# Patient Record
Sex: FEMALE | Race: BLACK OR AFRICAN AMERICAN | Employment: OTHER | URBAN - METROPOLITAN AREA
[De-identification: names, ages, dates, MRNs, and addresses within clinical notes are randomized per-mention and may not be internally consistent; named-entity substitution may affect disease eponyms.]

---

## 2017-01-20 ENCOUNTER — OFFICE VISIT (OUTPATIENT)
Dept: CARDIOLOGY CLINIC | Age: 72
End: 2017-01-20

## 2017-01-20 VITALS
BODY MASS INDEX: 29.49 KG/M2 | HEIGHT: 65 IN | DIASTOLIC BLOOD PRESSURE: 82 MMHG | WEIGHT: 177 LBS | HEART RATE: 96 BPM | SYSTOLIC BLOOD PRESSURE: 130 MMHG | OXYGEN SATURATION: 99 %

## 2017-01-20 DIAGNOSIS — I48.19 PERSISTENT ATRIAL FIBRILLATION (HCC): Primary | ICD-10-CM

## 2017-01-20 DIAGNOSIS — I05.0 MITRAL VALVE STENOSIS, RHEUMATIC: ICD-10-CM

## 2017-01-20 DIAGNOSIS — E78.00 PURE HYPERCHOLESTEROLEMIA: ICD-10-CM

## 2017-01-20 DIAGNOSIS — E11.9 TYPE 2 DIABETES MELLITUS WITHOUT COMPLICATION, WITHOUT LONG-TERM CURRENT USE OF INSULIN (HCC): ICD-10-CM

## 2017-01-20 DIAGNOSIS — I10 ESSENTIAL HYPERTENSION, BENIGN: ICD-10-CM

## 2017-01-20 RX ORDER — DILTIAZEM HYDROCHLORIDE 180 MG/1
180 CAPSULE, COATED, EXTENDED RELEASE ORAL DAILY
Qty: 30 CAP | Refills: 11 | Status: SHIPPED | OUTPATIENT
Start: 2017-01-20

## 2017-01-20 NOTE — MR AVS SNAPSHOT
Visit Information Date & Time Provider Department Dept. Phone Encounter #  
 1/20/2017 10:00 AM Wendy Mina MD [facility] [phone] Follow-up Instructions Return in about 6 months (around 7/20/2017). Upcoming Health Maintenance Date Due  
 FOOT EXAM Q1 2/27/1955 FOBT Q 1 YEAR AGE 50-75 2/27/1993 ZOSTER VACCINE AGE 60> 2/27/2003 OSTEOPOROSIS SCREENING (DEXA) 2/27/2008 Pneumococcal 65+ Low/Medium Risk (1 of 2 - PCV13) 2/27/2008 MEDICARE YEARLY EXAM 2/27/2008 DTaP/Tdap/Td series (2 - Td) 1/1/2011 HEMOGLOBIN A1C Q6M 2/17/2011 EYE EXAM RETINAL OR DILATED Q1 3/3/2011 MICROALBUMIN Q1 8/17/2011 LIPID PANEL Q1 8/17/2011 GLAUCOMA SCREENING Q2Y 3/3/2012 INFLUENZA AGE 9 TO ADULT 8/1/2016 BREAST CANCER SCRN MAMMOGRAM 12/5/2016 Allergies as of 1/20/2017  Review Complete On: 1/20/2017 By: Wendy Mina MD  
 No Known Allergies Current Immunizations  Reviewed on 3/24/2010 Name Date TDAP Vaccine 1/1/2001 Not reviewed this visit You Were Diagnosed With   
  
 Codes Comments Persistent atrial fibrillation (HCC)    -  Primary ICD-10-CM: I48.1 ICD-9-CM: 427.31 Mitral valve stenosis, rheumatic     ICD-10-CM: I05.0 ICD-9-CM: 394.0 Pure hypercholesterolemia     ICD-10-CM: E78.00 ICD-9-CM: 272.0 Essential hypertension, benign     ICD-10-CM: I10 
ICD-9-CM: 401.1 Type 2 diabetes mellitus without complication, without long-term current use of insulin (HCC)     ICD-10-CM: E11.9 ICD-9-CM: 250.00 Vitals BP Pulse Height(growth percentile) Weight(growth percentile) LMP SpO2  
 130/82 96 5' 5\" (1.651 m) 177 lb (80.3 kg) 01/01/1976 99% BMI OB Status Smoking Status 29.45 kg/m2 Postmenopausal Never Smoker Vitals History BMI and BSA Data Body Mass Index Body Surface Area  
 29.45 kg/m 2 1.92 m 2 Preferred Pharmacy Pharmacy Name Phone [facility] [address], [address] [place] [address] [phone] Your Updated Medication List  
  
   
This list is accurate as of: 1/20/17 10:34 AM.  Always use your most recent med list.  
  
  
  
  
 apixaban 5 mg tablet Commonly known as:  Lyell Mola Take 1 Tab by mouth two (2) times a day. aspirin 81 mg chewable tablet Take 81 mg by mouth daily. atorvastatin 10 mg tablet Commonly known as:  LIPITOR Take  by mouth daily. benazepril 20 mg tablet Commonly known as:  LOTENSIN Take 20 mg by mouth daily. CALTRATE 600+D PLUS MINERALS 600 mg calcium- 400 unit Tab Generic drug:  Calcium Carbonate-Vit D3-Min Take  by mouth. dilTIAZem  mg ER capsule Commonly known as:  CARDIZEM CD Take 1 Cap by mouth daily. glipiZIDE 5 mg tablet Commonly known as:  Tonie Kwabena Take 1 Tab by mouth daily. metoprolol succinate 50 mg XL tablet Commonly known as:  TOPROL-XL  
TAKE ONE TABLET BY MOUTH TWICE DAILY MULTIVITAMIN PO Take  by mouth. SITagliptin 50 mg tablet Commonly known as:  Rodriguez Hartford Take 50 mg by mouth daily. Prescriptions Sent to Pharmacy Refills  
 dilTIAZem CD (CARDIZEM CD) 180 mg ER capsule 11 Sig: Take 1 Cap by mouth daily. Class: Normal  
 Pharmacy: [address] Ph #: [phone] Route: Oral  
  
We Performed the Following AMB POC EKG ROUTINE W/ 12 LEADS, INTER & REP [28949 CPT(R)] Follow-up Instructions Return in about 6 months (around 7/20/2017). To-Do List   
 06/20/2017 ECHO:  2D ECHO COMPLETE ADULT (TTE) W OR WO CONTR   
  
 06/20/2017 10:00 AM  
  Appointment with [facility]- IE33 MACHINE (WT ) at [facility] NON-INVASIVE CARD ([phone]) Age Limit for ALL Heart procedures @ all [employer] facilities: 18 yrs and older only. Under the age of 25, refer to [address] [place] [address] ([phone]).    Wt Limit: 350lbs. This study requires patient to bring a written physician's order or MD office may fax the order to Central Scheduling at [phone]. Patient needs to bring a current list of all medications. No preparation is required for this study. Patients should report 15 minutes prior to their [facility], [address]. Introducing [facility] & HEALTH SERVICES! Caryl Villa introduces [employer] patient portal. Now you can access parts of your medical record, email your doctor's office, and request medication refills online. 1. In your internet browser, go to [email or web address] 2. Click on the First Time User? Click Here link in the Sign In box. You will see the New Member Sign Up page. 3. Enter your [employer] Access Code exactly as it appears below. You will not need to use this code after youve completed the sign-up process. If you do not sign up before the expiration date, you must request a new code. · [employer] Access Code: [email or web address] Expires: 4/20/2017  9:54 AM 
 
4. Enter the last four digits of your Social Security Number (xxxx) and Date of Birth (mm/dd/yyyy) as indicated and click Submit. You will be taken to the next sign-up page. 5. Create a [employer] ID. This will be your [employer] login ID and cannot be changed, so think of one that is secure and easy to remember. 6. Create a [employer] password. You can change your password at any time. 7. Enter your Password Reset Question and Answer. This can be used at a later time if you forget your password. 8. Enter your e-mail address. You will receive e-mail notification when new information is available in [address] 9. Click Sign Up. You can now view and download portions of your medical record. 10. Click the Download Summary menu link to download a portable copy of your medical information. If you have questions, please visit the Frequently Asked Questions section of the [employer] website. Remember, [employer] is NOT to be used for urgent needs. For medical emergencies, dial 911. Now available from your iPhone and Android! Please provide this summary of care documentation to your next provider. Your primary care clinician is listed as Cayla Christian. If you have any questions after today's visit, please call [phone].

## 2017-01-20 NOTE — PROGRESS NOTES
HISTORY OF PRESENT ILLNESS  Logan Clarke is a 66 y.o. female. Irregular Heart Beat    Pertinent negatives include no fever, no chest pain, no claudication, no orthopnea, no PND, no abdominal pain, no nausea, no vomiting, no headaches, no dizziness, no cough and no shortness of breath. Patient presents for a follow-up office visit. She has a past medical history significant for long-standing hypertension, dyslipidemia, and diabetes mellitus, type II. The patient was initially referred here for newly diagnosed atrial fibrillation which was found on a routine physical exam.  The patient reports a history of rheumatic fever as a young child, and states she has been told she had a cardiac murmur in the past.  She underwent an echocardiogram in June 2014, which showed normal LV systolic function, evidence of mild rheumatic mitral valvular disease with mild mitral stenosis without significant regurgitation. The remainder of her valve function was normal.  She also underwent an exercise nuclear stress test where she walked for 6 minutes on the treadmill without symptoms or EKG changes. Patient then underwent a followup echocardiogram in July 2015, which is essentially unchanged compared to the year prior. Patient was last seen in the office 6 months ago. Patient reports no significant change in her activity level since last visit. No new palpitations, dizziness, or syncope. No chest pain at rest or with exertion. No shortness of breath, orthopnea, PND, or leg swelling. The patient does state that her PCP decreased her metoprolol dosage to just once daily from twice daily. Past Medical History   Diagnosis Date    Arthritis     Cardiac echocardiogram 07/17/2015     A-fib (sinus rhythm on study of 6/12/14). EF 55-60%. No WMA. Mild LVH. Indeterminate diastolic fx. RVSP 35 mmHg. Mild-mod MS (mean grad 6 mmHg).       Cardiac Holter monitor, abnormal 01/12/2015     Atrial fibrillation with avg HR of 83 bpm and appropriate HR variation.  Cardiac nuclear imaging test 06/12/2014     Mildly abnormal.  No focal ischemia or prior infarction in setting of borderline to mild TID of 1.28, so balanced 3-vessel ischemia not completely excluded. No RWMA. EF 70%. Baseline A-fib w/appropriate increase in heart rate. Ex time 6 min 4 sec.  CTS (carpal tunnel syndrome) 1/27/2010    Diabetes ([address] 1/27/2010    Hypercholesterolemia     Hypertension     Hypertonicity of bladder     Mitral valve stenosis, rheumatic      Mild    Overactive bladder 1/27/2010    PAF (paroxysmal atrial fibrillation) [facility]) April 2014    Rheumatic fever age 1   [facility] Toe deformity 1/27/2010    Urinary tract infection, site not specified       Current Outpatient Prescriptions   Medication Sig Dispense Refill    dilTIAZem CD (CARDIZEM CD) 180 mg ER capsule Take 1 Cap by mouth daily. 30 Cap 11    aspirin 81 mg chewable tablet Take 81 mg by mouth daily.  sitaGLIPtin (JANUVIA) 50 mg tablet Take 50 mg by mouth daily.  metoprolol succinate (TOPROL-XL) 50 mg XL tablet TAKE ONE TABLET BY MOUTH TWICE DAILY (Patient taking differently: TAKE ONE TABLET BY MOUTH  DAILY) 60 Tab 6    apixaban (ELIQUIS) 5 mg tablet Take 1 Tab by mouth two (2) times a day. 60 Tab 6    benazepril (LOTENSIN) 20 mg tablet Take 20 mg by mouth daily.  atorvastatin (LIPITOR) 10 mg tablet Take  by mouth daily.  Calcium Carbonate-Vit D3-Min (CALTRATE 600+D PLUS MINERALS) 600 mg calcium- 400 unit tab Take  by mouth.  MULTIVITAMINS (MULTIVITAMIN PO) Take  by mouth.  glipiZIDE (GLUCOTROL) 5 mg tablet Take 1 Tab by mouth daily. 90 Tab 3       No Known Allergies     Social History   Substance Use Topics    Smoking status: Never Smoker    Smokeless tobacco: Never Used    Alcohol use No         Review of Systems   Constitutional: Negative for chills, fever and weight loss. HENT: Negative for nosebleeds.     Eyes: Negative for blurred vision and double vision. Respiratory: Negative for cough, shortness of breath and wheezing. Cardiovascular: Negative for chest pain, palpitations, orthopnea, claudication, leg swelling and PND. Gastrointestinal: Negative for abdominal pain, heartburn, nausea and vomiting. Genitourinary: Negative for dysuria and hematuria. Musculoskeletal: Negative for falls, joint pain and myalgias. Skin: Negative for rash. Neurological: Negative for dizziness, focal weakness and headaches. Endo/Heme/Allergies: Does not bruise/bleed easily. Psychiatric/Behavioral: Negative for substance abuse. Visit Vitals    /82    Pulse 96    Ht 5' 5\" (1.651 m)    Wt 80.3 kg (177 lb)    LMP 01/01/1976    SpO2 99%    BMI 29.45 kg/m2      Physical Exam   Constitutional: She is oriented to person, place, and time. She appears well-developed and well-nourished. HENT:   Head: Normocephalic and atraumatic. Eyes: Conjunctivae are normal.   Neck: Neck supple. No JVD present. Carotid bruit is not present. Cardiovascular: Normal rate, S1 normal, S2 normal and normal pulses. An irregularly irregular rhythm present. Exam reveals no gallop. Murmur heard. Systolic murmur is present with a grade of 1/6    Diastolic murmur is present  at the lower left sternal border  Pulmonary/Chest: Effort normal and breath sounds normal. She has no wheezes. She has no rales. Abdominal: Soft. Bowel sounds are normal. There is no tenderness. Musculoskeletal: She exhibits no edema. Neurological: She is alert and oriented to person, place, and time. Skin: Skin is warm and dry. EKG:  Atrial fibrillation, Controlled heart rate in the 90s, normal axis, nonspecific ST-T abnormality. Compared to previous EKG, no significant interval change. ASSESSMENT and PLAN    Persistent atrial fibrillation. Patient remained asymptomatic to her arrhythmia. This was incidentally diagnosed during a routine physical exam in April 2014.     She remains on Eliquis for anticoagulation, which can be continued as long as she does not develop severe valvular heart disease. Her rates appear adequately controlled on a combination of metoprolol and Cardizem, both of which I would continue. 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 Patient underwent a followup echocardiogram in July 2015 which was not significant changed compared to 2014. She continues to have mild to moderate stenosis of her mitral valve secondary to rheumatic heart disease. I would like to repeat an echocardiogram in 5-6 months to reevaluate her mitral stenosisr. Hypertension. Patient blood pressure is reasonably well controlled on her current antihypertensive regimen. All of which I would continue. Dyslipidemia. Patient currently on atorvastatin 10 mg daily. This is followed closely by her PCP. Diabetes mellitus, type II. This is been followed by her PCP. Her goal A1c should be under 7.     Followup in 6 months, sooner if needed up

## 2017-06-27 ENCOUNTER — HOSPITAL ENCOUNTER (OUTPATIENT)
Dept: NON INVASIVE DIAGNOSTICS | Age: 72
Discharge: HOME | End: 2017-06-27
Attending: INTERNAL MEDICINE
Payer: MEDICARE

## 2017-06-27 DIAGNOSIS — I05.0 MITRAL VALVE STENOSIS, RHEUMATIC: ICD-10-CM

## 2017-06-27 PROCEDURE — 93306 TTE W/DOPPLER COMPLETE: CPT

## 2017-10-12 ENCOUNTER — OFFICE VISIT (OUTPATIENT)
Dept: CARDIOLOGY CLINIC | Age: 72
End: 2017-10-12

## 2017-10-12 VITALS
HEIGHT: 65 IN | SYSTOLIC BLOOD PRESSURE: 118 MMHG | HEART RATE: 115 BPM | WEIGHT: 172 LBS | OXYGEN SATURATION: 98 % | BODY MASS INDEX: 28.66 KG/M2 | DIASTOLIC BLOOD PRESSURE: 70 MMHG

## 2017-10-12 DIAGNOSIS — I48.19 PERSISTENT ATRIAL FIBRILLATION (HCC): Primary | ICD-10-CM

## 2017-10-12 DIAGNOSIS — I05.0 MITRAL VALVE STENOSIS, RHEUMATIC: ICD-10-CM

## 2017-10-12 DIAGNOSIS — E11.9 TYPE 2 DIABETES MELLITUS WITHOUT COMPLICATION, WITHOUT LONG-TERM CURRENT USE OF INSULIN (HCC): ICD-10-CM

## 2017-10-12 DIAGNOSIS — E78.00 PURE HYPERCHOLESTEROLEMIA: ICD-10-CM

## 2017-10-12 NOTE — MR AVS SNAPSHOT
Visit Information Date & Time Provider Department Dept. Phone Encounter #  
 10/12/2017  8:20 AM Corinne Fabry, MD [facility] [id number] Upcoming Health Maintenance Date Due  
 FOOT EXAM Q1 2/27/1955 FOBT Q 1 YEAR AGE 50-75 2/27/1993 ZOSTER VACCINE AGE 60> 12/27/2002 OSTEOPOROSIS SCREENING (DEXA) 2/27/2008 Pneumococcal 65+ Low/Medium Risk (1 of 2 - PCV13) 2/27/2008 MEDICARE YEARLY EXAM 2/27/2008 DTaP/Tdap/Td series (2 - Td) 1/1/2011 HEMOGLOBIN A1C Q6M 2/17/2011 EYE EXAM RETINAL OR DILATED Q1 3/3/2011 MICROALBUMIN Q1 8/17/2011 LIPID PANEL Q1 8/17/2011 GLAUCOMA SCREENING Q2Y 3/3/2012 INFLUENZA AGE 9 TO ADULT 8/1/2017 Allergies as of 10/12/2017  Review Complete On: 10/12/2017 By: Polo Cook No Known Allergies Current Immunizations  Reviewed on 3/24/2010 Name Date TDAP Vaccine 1/1/2001 Not reviewed this visit You Were Diagnosed With   
  
 Codes Comments Persistent atrial fibrillation (HCC)    -  Primary ICD-10-CM: I48.1 ICD-9-CM: 427.31 Vitals BP Pulse Height(growth percentile) Weight(growth percentile) LMP SpO2  
 118/70 (!) 115 5' 5\" (1.651 m) 172 lb (78 kg) 01[birth year]01/1976 98% BMI OB Status Smoking Status 28.62 kg/m2 Postmenopausal Never Smoker Vitals History BMI and BSA Data Body Mass Index Body Surface Area  
 28.62 kg/m 2 1.89 m 2 Preferred Pharmacy Pharmacy Name Phone [facility] [address] [place] [address] [phone] Your Updated Medication List  
  
   
This list is accurate as of: 10/12/17  9:24 AM.  Always use your most recent med list.  
  
  
  
  
 apixaban 5 mg tablet Commonly known as:  Jodelle Patient Take 1 Tab by mouth two (2) times a day. aspirin 81 mg chewable tablet Take 81 mg by mouth daily. atorvastatin 10 mg tablet Commonly known as:  LIPITOR Take  by mouth daily. benazepril 20 mg tablet Commonly known as:  LOTENSIN Take 20 mg by mouth daily. CALTRATE 600+D PLUS MINERALS 600 mg calcium- 400 unit Tab Generic drug:  Calcium Carbonate-Vit D3-Min Take  by mouth. dilTIAZem  mg ER capsule Commonly known as:  CARDIZEM CD Take 1 Cap by mouth daily. glipiZIDE 5 mg tablet Commonly known as:  Katerin Petri Take 1 Tab by mouth daily. metoprolol succinate 50 mg XL tablet Commonly known as:  TOPROL-XL  
TAKE ONE TABLET BY MOUTH TWICE DAILY MULTIVITAMIN PO Take  by mouth. SITagliptin 50 mg tablet Commonly known as:  Shawanda Pipe Take 50 mg by mouth daily. We Performed the Following AMB POC EKG ROUTINE W/ 12 LEADS, INTER & REP [40852 CPT(R)] Introducing [facility]! Vicki Nixon introduces [employer] patient portal. Now you can access parts of your medical record, email your doctor's office, and request medication refills online. 1. In your internet browser, go to [email or web address] 2. Click on the First Time User? Click Here link in the Sign In box. You will see the New Member Sign Up page. 3. Enter your [employer] Access Code exactly as it appears below. You will not need to use this code after youve completed the sign-up process. If you do not sign up before the expiration date, you must request a new code. · [employer] Access Code: [email or web address] Expires: 1/10/2018  9:24 AM 
 
4. Enter the last four digits of your Social Security Number (xxxx) and Date of Birth (mm/dd/yyyy) as indicated and click Submit. You will be taken to the next sign-up page. 5. Create a [employer] ID. This will be your [employer] login ID and cannot be changed, so think of one that is secure and easy to remember. 6. Create a [employer] password. You can change your password at any time. 7. Enter your Password Reset Question and Answer. This can be used at a later time if you forget your password. 8. Enter your e-mail address. You will receive e-mail notification when new information is available in [address] 9. Click Sign Up. You can now view and download portions of your medical record. 10. Click the Download Summary menu link to download a portable copy of your medical information. If you have questions, please visit the Frequently Asked Questions section of the [employer] website. Remember, [employer] is NOT to be used for urgent needs. For medical emergencies, dial 911. Now available from your iPhone and Android! Please provide this summary of care documentation to your next provider. Your primary care clinician is listed as Nel Bashir. If you have any questions after today's visit, please call [phone].

## 2017-10-12 NOTE — PROGRESS NOTES
1. Have you been to the ER, urgent care clinic since your last visit? Hospitalized since your last visit? no  2. Have you seen or consulted any other health care providers outside of the [address] [place] [address] since your last visit? Include any pap smears or colon screening.  no

## 2017-10-12 NOTE — PROGRESS NOTES
HISTORY OF PRESENT ILLNESS  Oakley Homans is a 74 y.o. female. Irregular Heart Beat    Pertinent negatives include no fever, no chest pain, no claudication, no orthopnea, no PND, no abdominal pain, no nausea, no vomiting, no headaches, no dizziness, no cough and no shortness of breath. Patient presents for a follow-up office visit. She has a past medical history significant for long-standing hypertension, dyslipidemia, and diabetes mellitus, type II. The patient was initially referred here for newly diagnosed atrial fibrillation which was found on a routine physical exam.  The patient reports a history of rheumatic fever as a young child, and states she has been told she had a cardiac murmur in the past.  She underwent an echocardiogram in June 2014, which showed normal LV systolic function, evidence of mild rheumatic mitral valvular disease with mild mitral stenosis without significant regurgitation. The remainder of her valve function was normal.  She also underwent an exercise nuclear stress test where she walked for 6 minutes on the treadmill without symptoms or EKG changes. A repeat echocardiogram from June 2017 was not significantly changed compared to the previous study. Ejection fraction was 55%. She continued to have a rheumatic appearing mitral valve with mild to moderate stenosis. Patient was last seen in the office 8-9 months ago. Patient reports no significant change in her activity level since last visit. No new palpitations, dizziness, or syncope. No chest pain at rest or with exertion. No shortness of breath, orthopnea, PND, or leg swelling. Past Medical History:   Diagnosis Date    Arthritis     Cardiac echocardiogram 07/17/2015    A-fib (sinus rhythm on study of 6/12/14). EF 55-60%. No WMA. Mild LVH. Indeterminate diastolic fx. RVSP 35 mmHg. Mild-mod MS (mean grad 6 mmHg).       Cardiac Holter monitor, abnormal 01/12/2015    Atrial fibrillation with avg HR of 83 bpm and appropriate HR variation.  Cardiac nuclear imaging test 06/12/2014    Mildly abnormal.  No focal ischemia or prior infarction in setting of borderline to mild TID of 1.28, so balanced 3-vessel ischemia not completely excluded. No RWMA. EF 70%. Baseline A-fib w/appropriate increase in heart rate. Ex time 6 min 4 sec.  CTS (carpal tunnel syndrome) 1/27/2010    Diabetes ([address] 1/27/2010    Hypercholesterolemia     Hypertension     Hypertonicity of bladder     Mitral valve stenosis, rheumatic     Mild    Overactive bladder 1/27/2010    PAF (paroxysmal atrial fibrillation) [facility]) April 2014    Rheumatic fever age 1   [facility] Toe deformity 1/27/2010    Urinary tract infection, site not specified       Current Outpatient Prescriptions   Medication Sig Dispense Refill    apixaban (ELIQUIS) 5 mg tablet Take 1 Tab by mouth two (2) times a day. 60 Tab 6    dilTIAZem CD (CARDIZEM CD) 180 mg ER capsule Take 1 Cap by mouth daily. 30 Cap 11    aspirin 81 mg chewable tablet Take 81 mg by mouth daily.  sitaGLIPtin (JANUVIA) 50 mg tablet Take 50 mg by mouth daily.  metoprolol succinate (TOPROL-XL) 50 mg XL tablet TAKE ONE TABLET BY MOUTH TWICE DAILY (Patient taking differently: TAKE ONE TABLET BY MOUTH  DAILY) 60 Tab 6    benazepril (LOTENSIN) 20 mg tablet Take 20 mg by mouth daily.  atorvastatin (LIPITOR) 10 mg tablet Take  by mouth daily.  Calcium Carbonate-Vit D3-Min (CALTRATE 600+D PLUS MINERALS) 600 mg calcium- 400 unit tab Take  by mouth.  MULTIVITAMINS (MULTIVITAMIN PO) Take  by mouth.  glipiZIDE (GLUCOTROL) 5 mg tablet Take 1 Tab by mouth daily. 90 Tab 3       No Known Allergies     Social History   Substance Use Topics    Smoking status: Never Smoker    Smokeless tobacco: Never Used    Alcohol use No         Review of Systems   Constitutional: Negative for chills, fever and weight loss. HENT: Negative for nosebleeds.     Eyes: Negative for blurred vision and double vision. Respiratory: Negative for cough, shortness of breath and wheezing. Cardiovascular: Negative for chest pain, palpitations, orthopnea, claudication, leg swelling and PND. Gastrointestinal: Negative for abdominal pain, heartburn, nausea and vomiting. Genitourinary: Negative for dysuria and hematuria. Musculoskeletal: Negative for falls, joint pain and myalgias. Skin: Negative for rash. Neurological: Negative for dizziness, focal weakness and headaches. Endo/Heme/Allergies: Does not bruise/bleed easily. Psychiatric/Behavioral: Negative for substance abuse. Visit Vitals    /70    Pulse (!) 115    Ht 5' 5\" (1.651 m)    Wt 78 kg (172 lb)    LMP 01/01/1976    SpO2 98%    BMI 28.62 kg/m2      Physical Exam   Constitutional: She is oriented to person, place, and time. She appears well-developed and well-nourished. HENT:   Head: Normocephalic and atraumatic. Eyes: Conjunctivae are normal.   Neck: Neck supple. No JVD present. Carotid bruit is not present. Cardiovascular: Normal rate, S1 normal, S2 normal and normal pulses. An irregularly irregular rhythm present. Exam reveals no gallop. Murmur heard. Systolic murmur is present with a grade of 1/6    Diastolic murmur is present  at the lower left sternal border  Pulmonary/Chest: Effort normal and breath sounds normal. She has no wheezes. She has no rales. Abdominal: Soft. Bowel sounds are normal. There is no tenderness. Musculoskeletal: She exhibits no edema. Neurological: She is alert and oriented to person, place, and time. Skin: Skin is warm and dry. EKG:  Atrial fibrillation, heart rate in the 100s, normal axis, nonspecific ST-T abnormality. Compared to previous EKG, heart rate has increased. ASSESSMENT and PLAN    Persistent atrial fibrillation. Patient remained asymptomatic to her arrhythmia. This was incidentally diagnosed during a routine physical exam in April 2014.     She remains on Eliquis for anticoagulation, which can be continued as long as she does not develop severe valvular heart disease. Her rates are slightly higher than they were last visit, however, her pulse rate did decrease while sitting in the room. If her heart rate remains elevated, I would increase her metoprolol from 50 mg to 100 mg daily. 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 Patient underwent a followup echocardiogram in June 2017 which was not significant changed compared to the previous study. She continues to have mild to moderate stenosis of her mitral valve secondary to rheumatic heart disease. This can be reevaluated annually. Hypertension. Patient blood pressure is reasonably well controlled on her current antihypertensive regimen. All of which I would continue. Dyslipidemia. Patient currently on atorvastatin 10 mg daily. This is followed closely by her PCP. Diabetes mellitus, type II. This is been followed by her PCP. Her goal A1c should be under 7.     Followup in 6 months, sooner if needed up

## 2018-04-16 ENCOUNTER — OFFICE VISIT (OUTPATIENT)
Dept: CARDIOLOGY CLINIC | Age: 73
End: 2018-04-16

## 2018-04-16 VITALS
DIASTOLIC BLOOD PRESSURE: 74 MMHG | HEART RATE: 95 BPM | WEIGHT: 175 LBS | SYSTOLIC BLOOD PRESSURE: 128 MMHG | HEIGHT: 65 IN | OXYGEN SATURATION: 97 % | BODY MASS INDEX: 29.16 KG/M2

## 2018-04-16 DIAGNOSIS — E78.00 HYPERCHOLESTEROLEMIA: ICD-10-CM

## 2018-04-16 DIAGNOSIS — I48.19 PERSISTENT ATRIAL FIBRILLATION (HCC): Primary | ICD-10-CM

## 2018-04-16 DIAGNOSIS — I05.0 MITRAL VALVE STENOSIS, RHEUMATIC: ICD-10-CM

## 2018-04-16 DIAGNOSIS — E11.9 TYPE 2 DIABETES MELLITUS WITHOUT COMPLICATION, WITHOUT LONG-TERM CURRENT USE OF INSULIN (HCC): ICD-10-CM

## 2018-04-16 RX ORDER — METOPROLOL SUCCINATE 50 MG/1
50 TABLET, EXTENDED RELEASE ORAL DAILY
COMMUNITY

## 2018-04-16 NOTE — PROGRESS NOTES
1. Have you been to the ER, urgent care clinic since your last visit? Hospitalized since your last visit? No     2. Have you seen or consulted any other health care providers outside of the [address] [place] [address] since your last visit? Include any pap smears or colon screening.  No

## 2018-04-16 NOTE — PROGRESS NOTES
HISTORY OF PRESENT ILLNESS  Alexa Vasquez is a 74 y.o. female. Irregular Heart Beat    Pertinent negatives include no fever, no chest pain, no claudication, no orthopnea, no PND, no abdominal pain, no nausea, no vomiting, no headaches, no dizziness, no cough and no shortness of breath. Patient presents for a follow-up office visit. She has a past medical history significant for long-standing hypertension, dyslipidemia, and diabetes mellitus, type II. The patient was initially referred here for newly diagnosed atrial fibrillation which was found on a routine physical exam.  The patient reports a history of rheumatic fever as a young child, and states she has been told she had a cardiac murmur in the past.  She underwent an echocardiogram in June 2014, which showed normal LV systolic function, evidence of mild rheumatic mitral valvular disease with mild mitral stenosis without significant regurgitation. The remainder of her valve function was normal.  She also underwent an exercise nuclear stress test where she walked for 6 minutes on the treadmill without symptoms or EKG changes. A repeat echocardiogram from June 2017 was not significantly changed compared to the previous study. Ejection fraction was 55%. She continued to have a rheumatic appearing mitral valve with mild to moderate stenosis. Patient was last seen in the office 6months ago. Since last office visit, she states she has been feeling well. She denies any prolonged palpitations, dizziness or syncope. No change in her activity level. No exertional dyspnea or chest pain. No leg swelling or claudication. Past Medical History:   Diagnosis Date    Arthritis     Cardiac echocardiogram 07/17/2015    A-fib (sinus rhythm on study of 6/12/14). EF 55-60%. No WMA. Mild LVH. Indeterminate diastolic fx. RVSP 35 mmHg. Mild-mod MS (mean grad 6 mmHg).       Cardiac Holter monitor, abnormal 01/12/2015    Atrial fibrillation with avg HR of 83 bpm and appropriate HR variation.  Cardiac nuclear imaging test 06/12/2014    Mildly abnormal.  No focal ischemia or prior infarction in setting of borderline to mild TID of 1.28, so balanced 3-vessel ischemia not completely excluded. No RWMA. EF 70%. Baseline A-fib w/appropriate increase in heart rate. Ex time 6 min 4 sec.  CTS (carpal tunnel syndrome) 1/27/2010    Diabetes ([address] 1/27/2010    Hypercholesterolemia     Hypertension     Hypertonicity of bladder     Mitral valve stenosis, rheumatic     Mild    Overactive bladder 1/27/2010    PAF (paroxysmal atrial fibrillation) [facility]) April 2014    Rheumatic fever age 1   [facility] Toe deformity 1/27/2010    Urinary tract infection, site not specified       Current Outpatient Prescriptions   Medication Sig Dispense Refill    metoprolol succinate (TOPROL XL) 50 mg XL tablet Take 50 mg by mouth daily.  apixaban (ELIQUIS) 5 mg tablet Take 1 Tab by mouth two (2) times a day. 60 Tab 6    dilTIAZem CD (CARDIZEM CD) 180 mg ER capsule Take 1 Cap by mouth daily. 30 Cap 11    aspirin 81 mg chewable tablet Take 81 mg by mouth daily.  sitaGLIPtin (JANUVIA) 50 mg tablet Take 50 mg by mouth daily.  benazepril (LOTENSIN) 20 mg tablet Take 20 mg by mouth daily.  atorvastatin (LIPITOR) 10 mg tablet Take  by mouth daily.  Calcium Carbonate-Vit D3-Min (CALTRATE 600+D PLUS MINERALS) 600 mg calcium- 400 unit tab Take  by mouth.  MULTIVITAMINS (MULTIVITAMIN PO) Take  by mouth.  glipiZIDE (GLUCOTROL) 5 mg tablet Take 1 Tab by mouth daily. 90 Tab 3       No Known Allergies     Social History   Substance Use Topics    Smoking status: Never Smoker    Smokeless tobacco: Never Used    Alcohol use No         Review of Systems   Constitutional: Negative for chills, fever and weight loss. HENT: Negative for nosebleeds. Eyes: Negative for blurred vision and double vision.    Respiratory: Negative for cough, shortness of breath and wheezing. Cardiovascular: Negative for chest pain, palpitations, orthopnea, claudication, leg swelling and PND. Gastrointestinal: Negative for abdominal pain, heartburn, nausea and vomiting. Genitourinary: Negative for dysuria and hematuria. Musculoskeletal: Negative for falls, joint pain and myalgias. Skin: Negative for rash. Neurological: Negative for dizziness, focal weakness and headaches. Endo/Heme/Allergies: Does not bruise/bleed easily. Psychiatric/Behavioral: Negative for substance abuse. Visit Vitals    /74    Pulse 95    Ht 5' 5\" (1.651 m)    Wt 79.4 kg (175 lb)    LMP 01/01/1976    SpO2 97%    BMI 29.12 kg/m2      Physical Exam   Constitutional: She is oriented to person, place, and time. She appears well-developed and well-nourished. HENT:   Head: Normocephalic and atraumatic. Eyes: Conjunctivae are normal.   Neck: Neck supple. No JVD present. Carotid bruit is not present. Cardiovascular: Normal rate, S1 normal, S2 normal and normal pulses. An irregularly irregular rhythm present. Exam reveals no gallop. Murmur heard. Systolic murmur is present with a grade of 1/6    Diastolic murmur is present  at the lower left sternal border  Pulmonary/Chest: Effort normal and breath sounds normal. She has no wheezes. She has no rales. Abdominal: Soft. Bowel sounds are normal. There is no tenderness. Musculoskeletal: She exhibits no edema. Neurological: She is alert and oriented to person, place, and time. Skin: Skin is warm and dry. EKG:  Atrial fibrillation, heart rate in the 90s, normal axis, nonspecific ST-T abnormality. Compared to previous EKG, heart rate has decreased. ASSESSMENT and PLAN    Persistent atrial fibrillation. Patient remained asymptomatic to her arrhythmia. This was incidentally diagnosed during a routine physical exam in April 2014.     She remains on Eliquis for anticoagulation, which can be continued as long as she does not develop severe valvular heart disease. Her heart rates are relatively controlled on her current regimen of metoprolol and diltiazem, both which I would continue. 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 Patient underwent a followup echocardiogram in June 2017 which was not significant changed compared to the previous study. She continues to have mild to moderate stenosis of her mitral valve secondary to rheumatic heart disease. This can be reevaluated prior to next visit with a follow-up echocardiogram.    Hypertension. Patient blood pressure is reasonably well controlled on her current antihypertensive regimen. All of which I would continue. Dyslipidemia. Patient currently on atorvastatin 10 mg daily. This is followed closely by her PCP. Diabetes mellitus, type II. This is been followed by her PCP. Her goal A1c should be under 7.     Followup in 6 months, sooner if needed up

## 2018-04-16 NOTE — MR AVS SNAPSHOT
[address] [id number] [address] [address] [phone] Patient: Sterling Jasmine MRN:  [id number] Visit Information Date & Time Provider Department Dept. Phone Encounter #  
 4/16/2018 10:20 AM Oneida Oneill MD [facility] [address] [id number] Upcoming Health Maintenance Date Due  
 FOOT EXAM Q1 2/27/1955 ZOSTER VACCINE AGE 60> 12/27/2002 Bone Densitometry (Dexa) Screening 2/27/2008 Pneumococcal 65+ Low/Medium Risk (1 of 2 - PCV13) 2/27/2008 DTaP/Tdap/Td series (2 - Td) 1/1/2011 HEMOGLOBIN A1C Q6M 2/17/2011 EYE EXAM RETINAL OR DILATED Q1 3/3/2011 MICROALBUMIN Q1 8/17/2011 LIPID PANEL Q1 8/17/2011 GLAUCOMA SCREENING Q2Y 3/3/2012 Influenza Age 5 to Adult 8/1/2017 MEDICARE YEARLY EXAM 3/14/2018 Allergies as of 4/16/2018  Review Complete On: 10/12/2017 By: Oneida Oneill MD  
 No Known Allergies Current Immunizations  Reviewed on 3/24/2010 Name Date TDAP Vaccine 1/1/2001 Not reviewed this visit You Were Diagnosed With   
  
 Codes Comments Persistent atrial fibrillation (HCC)    -  Primary ICD-10-CM: I48.1 ICD-9-CM: 427.31 Mitral valve stenosis, rheumatic     ICD-10-CM: I05.0 ICD-9-CM: 394.0 Hypercholesterolemia     ICD-10-CM: E78.00 ICD-9-CM: 272.0 Vitals BP Pulse Height(growth percentile) Weight(growth percentile) LMP SpO2  
 128/74 95 5' 5\" (1.651 m) 175 lb (79.4 kg) 01/01/1976 97% BMI OB Status Smoking Status 29.12 kg/m2 Postmenopausal Never Smoker Vitals History BMI and BSA Data Body Mass Index Body Surface Area  
 29.12 kg/m 2 1.91 m 2 Preferred Pharmacy Pharmacy Name Phone [facility] [address] [phone] Your Updated Medication List  
  
   
 This list is accurate as of 4/16/18 11:00 AM.  Always use your most recent med list.  
  
  
  
  
 apixaban 5 mg tablet Commonly known as:  Sammi Lozoya Take 1 Tab by mouth two (2) times a day. aspirin 81 mg chewable tablet Take 81 mg by mouth daily. atorvastatin 10 mg tablet Commonly known as:  LIPITOR Take  by mouth daily. benazepril 20 mg tablet Commonly known as:  LOTENSIN Take 20 mg by mouth daily. CALTRATE 600+D PLUS MINERALS 600 mg calcium- 400 unit Tab Generic drug:  Calcium Carbonate-Vit D3-Min Take  by mouth. dilTIAZem  mg ER capsule Commonly known as:  CARDIZEM CD Take 1 Cap by mouth daily. glipiZIDE 5 mg tablet Commonly known as:  Pilar Craw Take 1 Tab by mouth daily. MULTIVITAMIN PO Take  by mouth. SITagliptin 50 mg tablet Commonly known as:  Pos[place] Take 50 mg by mouth daily. TOPROL XL 50 mg XL tablet Generic drug:  metoprolol succinate Take 50 mg by mouth daily. We Performed the Following AMB POC EKG ROUTINE W/ 12 LEADS, INTER & REP [36432 CPT(R)] To-Do List   
 09/16/2018 ECHO:  2D ECHO COMPLETE ADULT (TTE) W OR WO CONTR Introducing [facility]! Lizbeth Britt introduces [employer] Education patient portal. Now you can access parts of your medical record, email your doctor's office, and request medication refills online. 1. In your internet browser, go to [email or web address] 2. Click on the First Time User? Click Here link in the Sign In box. You will see the New Member Sign Up page. 3. Enter your ETC Education Access Code exactly as it appears below. You will not need to use this code after youve completed the sign-up process. If you do not sign up before the expiration date, you must request a new code. · ETC Education Access Code: [email or web address] Expires: 7/15/2018 10:18 AM 
 
4.  Enter the last four digits of your Social Security Number (xxxx) and Date of Birth (mm/dd/yyyy) as indicated and click Submit. You will be taken to the next sign-up page. 5. Create a [email or web address] ID. This will be your [email or web address] login ID and cannot be changed, so think of one that is secure and easy to remember. 6. Create a [email or web address] password. You can change your password at any time. 7. Enter your Password Reset Question and Answer. This can be used at a later time if you forget your password. 8. Enter your e-mail address. You will receive e-mail notification when new information is available in [address] 9. Click Sign Up. You can now view and download portions of your medical record. 10. Click the Download Summary menu link to download a portable copy of your medical information. If you have questions, please visit the Frequently Asked Questions section of the [email or web address] website. Remember, [email or web address] is NOT to be used for urgent needs. For medical emergencies, dial 911. Now available from your iPhone and Android! Please provide this summary of care documentation to your next provider. Your primary care clinician is listed as Isaac Duenas. If you have any questions after today's visit, please call [phone].

## 2018-10-08 ENCOUNTER — HOSPITAL ENCOUNTER (OUTPATIENT)
Dept: NON INVASIVE DIAGNOSTICS | Age: 73
Discharge: HOME | End: 2018-10-08
Attending: INTERNAL MEDICINE
Payer: MEDICARE

## 2018-10-08 VITALS
BODY MASS INDEX: 29.16 KG/M2 | HEIGHT: 65 IN | DIASTOLIC BLOOD PRESSURE: 74 MMHG | WEIGHT: 175 LBS | SYSTOLIC BLOOD PRESSURE: 128 MMHG

## 2018-10-08 DIAGNOSIS — I05.0 MITRAL VALVE STENOSIS, RHEUMATIC: ICD-10-CM

## 2018-10-08 DIAGNOSIS — I48.19 PERSISTENT ATRIAL FIBRILLATION (HCC): ICD-10-CM

## 2018-10-08 LAB
ECHO AO ROOT DIAM: 3.54 CM
ECHO LA AREA 4C: 22.7 CM2
ECHO LA VOL 2C: 127.39 ML (ref 22–52)
ECHO LA VOL 4C: 65.32 ML (ref 22–52)
ECHO LA VOL BP: 100.25 ML (ref 22–52)
ECHO LA VOL/BSA BIPLANE: 53.64 ML/M2
ECHO LA VOLUME INDEX A2C: 68.16 ML/M2
ECHO LA VOLUME INDEX A4C: 34.95 ML/M2
ECHO LV INTERNAL DIMENSION DIASTOLIC: 4.47 CM (ref 3.9–5.3)
ECHO LV INTERNAL DIMENSION SYSTOLIC: 2.93 CM
ECHO LV IVSD: 1.03 CM (ref 0.6–0.9)
ECHO LV MASS 2D: 184.5 G (ref 67–162)
ECHO LV MASS INDEX 2D: 98.7 G/M2
ECHO LV POSTERIOR WALL DIASTOLIC: 1.04 CM (ref 0.6–0.9)
ECHO LVOT DIAM: 2.18 CM
ECHO LVOT PEAK GRADIENT: 3.1 MMHG
ECHO LVOT PEAK VELOCITY: 87.46 CM/S
ECHO LVOT VTI: 16.06 CM
ECHO MV AREA VTI: 1.1 CM2
ECHO MV MAX VELOCITY: 223.48 CM/S
ECHO MV MEAN GRADIENT: 9.3 MMHG
ECHO MV PEAK GRADIENT: 20 MMHG
ECHO MV VTI: 56.17 CM
ECHO RV INTERNAL DIMENSION: 3.21 CM
ECHO TV REGURGITANT MAX VELOCITY: 257.45 CM/S
ECHO TV REGURGITANT PEAK GRADIENT: 26.5 MMHG

## 2018-10-08 PROCEDURE — 93306 TTE W/DOPPLER COMPLETE: CPT

## 2018-10-12 NOTE — PROGRESS NOTES
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 Patient underwent a followup echocardiogram in June 2017 which was not significant changed compared to the previous study. She continues to have mild to moderate stenosis of her mitral valve secondary to rheumatic heart disease.   This can be reevaluated prior to next visit with a follow-up echocardiogram.

## 2018-10-18 ENCOUNTER — TELEPHONE (OUTPATIENT)
Dept: CARDIOLOGY CLINIC | Age: 73
End: 2018-10-18

## 2018-10-18 NOTE — TELEPHONE ENCOUNTER
----- Message from Scout Whitfield MD sent at 10/12/2018  2:28 PM EDT ----- Please let the patient know that her mitral valve disease was now in the moderate range. This was slightly worse than it was a year ago. This  is nothing to be concerned about at this time but should be evaluated annually with repeat echocardiograms. ----- Message ----- From: Peace Burr RN Sent: 10/12/2018   7:37 AM 
     To: Scout Whitfield MD 
 
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 Patient underwent a followup echocardiogram in June 2017 which was not significant changed compared to the previous study. She continues to have mild to moderate stenosis of her mitral valve secondary to rheumatic heart disease.   This can be reevaluated prior to next visit with a follow-up echocardiogram.

## 2018-11-28 ENCOUNTER — OFFICE VISIT (OUTPATIENT)
Dept: CARDIOLOGY CLINIC | Age: 73
End: 2018-11-28

## 2018-11-28 VITALS
BODY MASS INDEX: 28.82 KG/M2 | DIASTOLIC BLOOD PRESSURE: 78 MMHG | HEART RATE: 84 BPM | WEIGHT: 173 LBS | HEIGHT: 65 IN | SYSTOLIC BLOOD PRESSURE: 126 MMHG | OXYGEN SATURATION: 96 %

## 2018-11-28 DIAGNOSIS — I10 ESSENTIAL HYPERTENSION, BENIGN: ICD-10-CM

## 2018-11-28 DIAGNOSIS — I05.0 MITRAL VALVE STENOSIS, RHEUMATIC: Primary | ICD-10-CM

## 2018-11-28 DIAGNOSIS — I48.19 PERSISTENT ATRIAL FIBRILLATION (HCC): ICD-10-CM

## 2018-11-28 DIAGNOSIS — E78.00 HYPERCHOLESTEROLEMIA: ICD-10-CM

## 2018-11-28 DIAGNOSIS — E11.9 TYPE 2 DIABETES MELLITUS WITHOUT COMPLICATION, WITHOUT LONG-TERM CURRENT USE OF INSULIN (HCC): ICD-10-CM

## 2018-11-28 NOTE — PATIENT INSTRUCTIONS
Instructions Patients Name:  Jammie Brambila 1. You are scheduled to have a cardiac catheterization on December 13, 2018 at 0800 Please check in at 0700  . 2. Please go to [facility] and park in the outpatient parking lot that is located around to the back of the hospital and enter through the [facility] building. Once you enter through the [facility] check in with the  there. The  will either give you directions or assist you in getting to the cath holding area. 3. You are not to eat or drink anything after midnight the night before your procedure. Small sips of water to take your medications is ok. 4. If you are diabetic, do not take your insulin/sugar pill the morning of the procedure. 5. MEDICATION INSTRUCTIONS:   Please take your morning medications with the following special instructions: 
 
[x]          Please make sure to take your Blood pressure medication : . [x]          Take your Aspirin and/or Plavix. [x]          Hold Eliquis 48 hours prior to procedure 6. We encourage families to wait in the waiting room on the first floor while the procedure is being done. The Doctor will come out and talk with you as soon as the procedure is over. 7. There is the possibility that you may spend the night in the hospital, depending on the results of the procedure. This will be determined after the procedure is done. If angioplasty or stent is planned, you will stay at least one day. 8. If you or your family have any questions, please call our office Monday Friday, 9:00 a. m.4:30 p.m.,  At [phone], and ask to speak to one of the nurses.

## 2018-11-28 NOTE — PROGRESS NOTES
HISTORY OF PRESENT ILLNESS Agnieszka Dey is a 74 y.o. female. Palpitations Associated symptoms include malaise/fatigue and shortness of breath. Pertinent negatives include no fever, no chest pain, no claudication, no orthopnea, no PND, no abdominal pain, no nausea, no vomiting, no headaches, no dizziness and no cough. Patient presents for a follow-up office visit. She has a past medical history significant for long-standing hypertension, dyslipidemia, and diabetes mellitus, type II. The patient was initially referred here for newly diagnosed atrial fibrillation which was found on a routine physical exam.  The patient reports a history of rheumatic fever as a young child, and states she has been told she had a cardiac murmur in the past.  She underwent an echocardiogram in June 2014, which showed normal LV systolic function, evidence of mild rheumatic mitral valvular disease with mild mitral stenosis without significant regurgitation. The remainder of her valve function was normal.  She also underwent an exercise nuclear stress test where she walked for 6 minutes on the treadmill without symptoms or EKG changes. A repeat echocardiogram from October 2018 demonstrated worsening mitral valve stenosis, now in the moderate range with a mean valve gradient between 9 and 10 mmHg. Ejection fraction remained normal at 56-60%. Patient was last seen in the office 7 months ago. Since last office visit, she has noted significant decrease in her activity tolerance. She was previously walking 2 miles without much difficulty, but now she is only able to walk half as far and is quite winded during the second half of her walk and feels like she has marked fatigue when she returns home. She is also noted that she has to walk slower than she has been in the past.  She denies any chest pain, no orthopnea, PND or leg swelling. No prolonged dizzy spells or syncope. Past Medical History:  
Diagnosis Date  Arthritis  Cardiac echocardiogram 07/17/2015 A-fib (sinus rhythm on study of 6/12/14). EF 55-60%. No WMA. Mild LVH. Indeterminate diastolic fx. RVSP 35 mmHg. Mild-mod MS (mean grad 6 mmHg).  Cardiac Holter monitor, abnormal 01/12/2015 Atrial fibrillation with avg HR of 83 bpm and appropriate HR variation.  Cardiac nuclear imaging test 06/12/2014 Mildly abnormal.  No focal ischemia or prior infarction in setting of borderline to mild TID of 1.28, so balanced 3-vessel ischemia not completely excluded. No RWMA. EF 70%. Baseline A-fib w/appropriate increase in heart rate. Ex time 6 min 4 sec.  CTS (carpal tunnel syndrome) 1/27/2010  Diabetes ([address] 1/27/2010  Hypercholesterolemia  Hypertension  Hypertonicity of bladder  Mitral valve stenosis, rheumatic Mild  Overactive bladder 1/27/2010  PAF (paroxysmal atrial fibrillation) [facility]) April 2014  Rheumatic fever age 1  Toe deformity 1/27/2010  Urinary tract infection, site not specified Current Outpatient Medications Medication Sig Dispense Refill  metoprolol succinate (TOPROL XL) 50 mg XL tablet Take 50 mg by mouth daily.  apixaban (ELIQUIS) 5 mg tablet Take 1 Tab by mouth two (2) times a day. 60 Tab 6  
 dilTIAZem CD (CARDIZEM CD) 180 mg ER capsule Take 1 Cap by mouth daily. 30 Cap 11  
 aspirin 81 mg chewable tablet Take 81 mg by mouth daily.  sitaGLIPtin (JANUVIA) 50 mg tablet Take 50 mg by mouth daily.  benazepril (LOTENSIN) 20 mg tablet Take 20 mg by mouth daily.  atorvastatin (LIPITOR) 10 mg tablet Take  by mouth daily.  Calcium Carbonate-Vit D3-Min (CALTRATE 600+D PLUS MINERALS) 600 mg calcium- 400 unit tab Take  by mouth.  MULTIVITAMINS (MULTIVITAMIN PO) Take  by mouth.  glipiZIDE (GLUCOTROL) 5 mg tablet Take 1 Tab by mouth daily. 90 Tab 3 No Known Allergies Social History Tobacco Use  Smoking status: Never Smoker  Smokeless tobacco: Never Used Substance Use Topics  Alcohol use: No  
 Drug use: No  
   
Family History Problem Relation Age of Onset  Hypertension Mother  Heart Failure Mother  Hypertension Sister  Diabetes Sister Review of Systems Constitutional: Positive for malaise/fatigue. Negative for chills, fever and weight loss. HENT: Negative for nosebleeds. Eyes: Negative for blurred vision and double vision. Respiratory: Positive for shortness of breath. Negative for cough and wheezing. Cardiovascular: Positive for palpitations. Negative for chest pain, orthopnea, claudication, leg swelling and PND. Gastrointestinal: Negative for abdominal pain, heartburn, nausea and vomiting. Genitourinary: Negative for dysuria and hematuria. Musculoskeletal: Negative for falls, joint pain and myalgias. Skin: Negative for rash. Neurological: Negative for dizziness, focal weakness and headaches. Endo/Heme/Allergies: Does not bruise/bleed easily. Psychiatric/Behavioral: Negative for substance abuse. Visit Vitals /78 Pulse 84 Ht 5' 5\" (1.651 m) Wt 78.5 kg (173 lb) LMP 01/01/1976 SpO2 96% BMI 28.79 kg/m² Physical Exam  
Constitutional: She is oriented to person, place, and time. She appears well-developed and well-nourished. HENT:  
Head: Normocephalic and atraumatic. Eyes: Conjunctivae are normal.  
Neck: Neck supple. No JVD present. Carotid bruit is not present. Cardiovascular: Normal rate, S1 normal, S2 normal and normal pulses. An irregularly irregular rhythm present. Exam reveals no gallop. Murmur heard. Systolic murmur is present with a grade of 1/6. Diastolic murmur is present at the lower left sternal border. Pulmonary/Chest: Effort normal and breath sounds normal. She has no wheezes. She has no rales. Abdominal: Soft. Bowel sounds are normal. There is no tenderness. Musculoskeletal: She exhibits no edema. Neurological: She is alert and oriented to person, place, and time. Skin: Skin is warm and dry. EKG:  Atrial fibrillation, heart rate in the 80s, normal axis, nonspecific ST-T abnormality. Compared to previous EKG, heart rate has decreased. ASSESSMENT and PLAN Mitral valve stenosis: Presumed secondary to rheumatic valvular heart disease. This is now in the moderate to severe range on her follow-up echocardiogram from October 2018. She also has decreased activity tolerance and worsening exertional dyspnea, so I suspect this is likely due to her worsening valvular heart disease. I have recommended a right and left heart catheterization for further evaluation. If she does not have any significant CAD, she may be a candidate for a percutaneous balloon valvuloplasty. Persistent atrial fibrillation. Patient remained relatively asymptomatic to her arrhythmia. This was incidentally diagnosed during a routine physical exam in April 2014. She remains on Eliquis for anticoagulation, which may need to be changed over to warfarin at some point in the future now that her valve disease has been worsening. Hypertension. Patient blood pressure is reasonably well controlled on her current antihypertensive regimen. All of which I would continue. Dyslipidemia. Patient currently on atorvastatin 10 mg daily. This is followed closely by her PCP. Diabetes mellitus, type II. This is been followed by her PCP. Her goal A1c should be under 7. Followup in 4-6 weeks following her cardiac catheterization, sooner if needed. Total visit time was 40 minutes of which greater than 50% was face-to-face counseling

## 2018-11-28 NOTE — PROGRESS NOTES
Sridevi Pozo presents today for Chief Complaint Patient presents with  Irregular Heart Beat  
  6 month follow up - no cardiac complaints Sridevi Pozo preferred language for health care discussion is english/other. Is someone accompanying this pt? No  
 
Is the patient using any DME equipment during OV? No  
 
Depression Screening: No flowsheet data found. Learning Assessment: 
Learning Assessment 7/8/2016 PRIMARY LEARNER Patient HIGHEST LEVEL OF EDUCATION - PRIMARY LEARNER  -  
CO-LEARNER CAREGIVER -  
PRIMARY LANGUAGE ENGLISH  
LEARNER PREFERENCE PRIMARY DEMONSTRATION  
ANSWERED BY Patient RELATIONSHIP SELF Abuse Screening: No flowsheet data found. Fall Risk No flowsheet data found. Pt currently taking Anticoagulant therapy? eliquis Coordination of Care: 1. Have you been to the ER, urgent care clinic since your last visit? Hospitalized since your last visit? No  
 
2. Have you seen or consulted any other health care providers outside of the [address] [place] [address] since your last visit? Include any pap smears or colon screening.  No

## 2018-12-04 ENCOUNTER — DOCUMENTATION ONLY (OUTPATIENT)
Dept: CARDIOLOGY CLINIC | Age: 73
End: 2018-12-04

## 2018-12-04 NOTE — PROGRESS NOTES
Per Jim Singh LPN . .. Patient called stating that she wants to cancel the R&L Cath that she has scheduled for 12/13/18 . .. She is changing physicians.

## 2022-03-18 PROBLEM — I48.19 PERSISTENT ATRIAL FIBRILLATION (HCC): Status: ACTIVE | Noted: 2017-10-12
